# Patient Record
Sex: FEMALE | Race: WHITE | NOT HISPANIC OR LATINO | Employment: UNEMPLOYED | RURAL
[De-identification: names, ages, dates, MRNs, and addresses within clinical notes are randomized per-mention and may not be internally consistent; named-entity substitution may affect disease eponyms.]

---

## 2024-08-22 ENCOUNTER — OFFICE VISIT (OUTPATIENT)
Dept: FAMILY MEDICINE | Facility: CLINIC | Age: 39
End: 2024-08-22

## 2024-08-22 VITALS
WEIGHT: 101 LBS | SYSTOLIC BLOOD PRESSURE: 116 MMHG | HEART RATE: 74 BPM | DIASTOLIC BLOOD PRESSURE: 78 MMHG | TEMPERATURE: 99 F | RESPIRATION RATE: 18 BRPM | OXYGEN SATURATION: 98 %

## 2024-08-22 DIAGNOSIS — A77.0 ROCKY MOUNTAIN SPOTTED FEVER: Primary | ICD-10-CM

## 2024-08-22 RX ORDER — DOXYCYCLINE 100 MG/1
100 CAPSULE ORAL 2 TIMES DAILY
Qty: 14 CAPSULE | Refills: 0 | Status: SHIPPED | OUTPATIENT
Start: 2024-08-22 | End: 2024-08-29

## 2024-08-22 NOTE — PROGRESS NOTES
Subjective:       Patient ID: Marly Hanley is a 39 y.o. female.    Chief Complaint: Tick Removal (Room 5 patient was bit on the neck by a tick, c/o BA, ST, SWOLLEN LYMPH NODES AND RASH on 08/15/2024)    Patient is a 39-year-old female who presents to the clinic for rash and myalgias.  She has no significant past history besides tobacco dependence and recreational alcohol use.  She reports recently moving to [place] from [place].  She reports camping last week when she found a tick on her left neck.  Patient reports removing the tick and it was on her body less than an hour.  She reports fever, swollen lymph nodes, rash, diarrhea and myalgia.  She endorses headaches occasionally.  She denies shortness or breath, chest pain or abdominal pain.  Patient encouraged to follow up again in clinic if symptoms do not improve after antibiotics or if fever returns.      Current Outpatient Medications:     doxycycline (VIBRAMYCIN) 100 MG Cap, Take 1 capsule (100 mg total) by mouth 2 (two) times daily. for 7 days, Disp: 14 capsule, Rfl: 0    Review of patient's allergies indicates:   Allergen Reactions    Amoxicillin (bulk) Hives    Penicillanic sulfone bl beta-lactamase inhibitors Hives       History reviewed. No pertinent past medical history.    Past Surgical History:   Procedure Laterality Date    ankle stabilization      foot stabilization         Family History   Problem Relation Name Age of Onset    Thyroid disease Mother      Thyroid disease Maternal Grandmother      Stroke Maternal Grandmother         Social History     Tobacco Use    Smoking status: Every Day     Current packs/day: 1.00     Types: Cigarettes    Smokeless tobacco: Never   Substance Use Topics    Alcohol use: Yes     Alcohol/week: 2.0 standard drinks of alcohol     Types: 2 Cans of beer per week    Drug use: Yes     Types: Marijuana       Review of Systems   Constitutional:  Positive for activity change, fatigue and fever. Negative for  chills and diaphoresis.   HENT:  Negative for nasal congestion, dental problem, ear discharge, ear pain, mouth dryness and goiter.    Eyes:  Negative for discharge and itching.   Respiratory:  Negative for cough, shortness of breath, wheezing and stridor.    Gastrointestinal:  Positive for diarrhea. Negative for abdominal distention, abdominal pain, change in bowel habit, constipation, rectal pain and vomiting.   Endocrine: Negative for cold intolerance and heat intolerance.   Genitourinary:  Negative for bladder incontinence, difficulty urinating and dysuria.   Musculoskeletal:  Positive for arthralgias, joint swelling, leg pain and myalgias. Negative for gait problem.   Integumentary:  Positive for rash, wound and mole/lesion. Negative for pallor.   Allergic/Immunologic: Negative for environmental allergies and food allergies.   Neurological:  Negative for tremors, seizures, syncope, light-headedness, memory loss and coordination difficulties.   Psychiatric/Behavioral:  Negative for agitation, confusion and dysphoric mood. The patient is not nervous/anxious and is not hyperactive.    All other systems reviewed and are negative.      Current Medications:   Medication List with Changes/Refills   New Medications    DOXYCYCLINE (VIBRAMYCIN) 100 MG CAP    Take 1 capsule (100 mg total) by mouth 2 (two) times daily. for 7 days       Start Date: 8/22/2024 End Date: 8/29/2024            Objective:        Vitals:    08/22/24 0821   BP: 116/78   BP Location: Left arm   Patient Position: Sitting   BP Method: Medium (Automatic)   Pulse: 74   Resp: 18   Temp: 98.6 °F (37 °C)   TempSrc: Oral   SpO2: 98%   Weight: 45.8 kg (101 lb)       Physical Exam  Vitals and nursing note reviewed. Exam conducted with a chaperone present.   Constitutional:       General: She is awake. She is not in acute distress.     Appearance: Normal appearance. She is well-developed, well-groomed and normal weight. She is ill-appearing. She is not  "toxic-appearing or diaphoretic.   HENT:      Head: Normocephalic and atraumatic.      Nose: Nose normal.   Eyes:      Conjunctiva/sclera: Conjunctivae normal.   Cardiovascular:      Rate and Rhythm: Normal rate and regular rhythm.      Pulses: Normal pulses.      Heart sounds: Normal heart sounds.   Pulmonary:      Effort: Pulmonary effort is normal.      Breath sounds: Normal breath sounds.   Abdominal:      General: Bowel sounds are normal.      Palpations: Abdomen is soft.   Musculoskeletal:      Right lower leg: No edema.      Left lower leg: No edema.   Lymphadenopathy:      Cervical: Cervical adenopathy present.   Skin:     Findings: Erythema (right foot rash) and lesion (left side of neck, (reported tick bite area)) present.   Neurological:      Mental Status: She is alert and oriented to person, place, and time.   Psychiatric:         Mood and Affect: Mood normal.         Behavior: Behavior normal. Behavior is cooperative.         Thought Content: Thought content normal.         Judgment: Judgment normal.           No results found for: "WBC", "HGB", "HCT", "PLT", "CHOL", "TRIG", "HDL", "LDLDIRECT", "ALT", "AST", "NA", "K", "CL", "CREATININE", "BUN", "CO2", "TSH", "PSA", "INR", "GLUF", "HGBA1C", "MICROALBUR"   Assessment:       1. [place] spotted fever        Plan:         Problem List Items Addressed This Visit          ID    [place] spotted fever - Primary     M: continue to monitor symptoms of body aches, fever, rash, swollen lymph nodes  E: Patient with tick bite while camping, afebrile in clinic  A:  Vital signs normal in clinic, afebrile, rash on right foot, lower extremity adenopathy, tick bite visible on the left side of neck  T:  Doxycycline 100 mg twice daily for 7 days         Relevant Medications    doxycycline (VIBRAMYCIN) 100 MG Cap         Follow up if symptoms worsen or fail to improve.    Chris Tilley MD     Instructed patient that if symptoms fail to improve or worsen patient " should seek immediate medical attention or report to the nearest emergency department. Patient expressed verbal agreement and understanding to this plan of care.

## 2024-08-22 NOTE — ASSESSMENT & PLAN NOTE
M: continue to monitor symptoms of body aches, fever, rash, swollen lymph nodes  E: Patient with tick bite while camping, afebrile in clinic  A:  Vital signs normal in clinic, afebrile, rash on right foot, lower extremity adenopathy, tick bite visible on the left side of neck  T:  Doxycycline 100 mg twice daily for 7 days

## 2025-02-10 LAB — BCS RECOMMENDATION EXT: NORMAL

## 2025-02-17 ENCOUNTER — PATIENT OUTREACH (OUTPATIENT)
Facility: HOSPITAL | Age: 40
End: 2025-02-17